# Patient Record
Sex: MALE | Race: WHITE | URBAN - NONMETROPOLITAN AREA
[De-identification: names, ages, dates, MRNs, and addresses within clinical notes are randomized per-mention and may not be internally consistent; named-entity substitution may affect disease eponyms.]

---

## 2018-01-17 PROBLEM — I10 HYPERTENSION: Status: ACTIVE | Noted: 2018-01-17

## 2018-01-17 PROBLEM — H90.0 CONDUCTIVE HEARING LOSS, BILATERAL: Status: ACTIVE | Noted: 2018-01-17

## 2018-01-17 PROBLEM — E78.5 HYPERLIPIDEMIA: Status: ACTIVE | Noted: 2018-01-17

## 2018-01-17 RX ORDER — DIPHENOXYLATE HYDROCHLORIDE AND ATROPINE SULFATE 2.5; .025 MG/1; MG/1
1 TABLET ORAL
COMMUNITY

## 2018-01-17 RX ORDER — HYDROCHLOROTHIAZIDE 25 MG/1
25 TABLET ORAL
COMMUNITY

## 2018-01-17 RX ORDER — SIMVASTATIN 40 MG
40 TABLET ORAL
COMMUNITY

## 2018-01-17 RX ORDER — CHLORAL HYDRATE 500 MG
1 CAPSULE ORAL
COMMUNITY

## 2018-01-17 RX ORDER — VALSARTAN 320 MG/1
320 TABLET ORAL
COMMUNITY

## 2018-01-17 RX ORDER — AMLODIPINE BESYLATE 10 MG/1
10 TABLET ORAL
COMMUNITY

## 2018-01-17 RX ORDER — MULTIVIT WITH MINERALS/LUTEIN
TABLET ORAL
COMMUNITY

## 2018-09-18 ENCOUNTER — HOSPITAL ENCOUNTER (EMERGENCY)
Facility: OTHER | Age: 78
Discharge: HOME | End: 2018-09-18
Attending: EMERGENCY MEDICINE | Admitting: EMERGENCY MEDICINE

## 2018-09-18 VITALS
RESPIRATION RATE: 18 BRPM | DIASTOLIC BLOOD PRESSURE: 89 MMHG | TEMPERATURE: 97.9 F | OXYGEN SATURATION: 96 % | SYSTOLIC BLOOD PRESSURE: 173 MMHG | HEIGHT: 65 IN | HEART RATE: 89 BPM

## 2018-09-18 DIAGNOSIS — H60.392 INFECTIVE OTITIS EXTERNA, LEFT: ICD-10-CM

## 2018-09-18 PROCEDURE — 99282 EMERGENCY DEPT VISIT SF MDM: CPT | Performed by: EMERGENCY MEDICINE

## 2018-09-18 PROCEDURE — 99283 EMERGENCY DEPT VISIT LOW MDM: CPT | Mod: Z6 | Performed by: EMERGENCY MEDICINE

## 2018-09-18 ASSESSMENT — ENCOUNTER SYMPTOMS
VOMITING: 0
AGITATION: 0
CHILLS: 0
SHORTNESS OF BREATH: 0
ARTHRALGIAS: 0
NAUSEA: 0
LIGHT-HEADEDNESS: 0
DYSURIA: 0
FEVER: 0

## 2018-09-18 ASSESSMENT — VISUAL ACUITY
OD: NOT TESTED
OS: NOT TESTED

## 2018-09-18 NOTE — ED PROVIDER NOTES
History     Chief Complaint   Patient presents with     Otalgia     Patient is a 78 year old male presenting with ear pain. The history is provided by the patient.   Ear Problem   Associated symptoms: no congestion, no fever, no rash and no vomiting      Carlos Guerin is a 78 year old male who is here complaining of left ear pain.  He recently got some new hearing aids which are much larger than his previous.  He has pain in the left ear which she believes is from this.  He normally sleeps lying on his left side and he was unable to sleep the last couple nights because of pain in the ear.  No drainage.  No other change in hearing.  Not feeling ill otherwise.    Problem List:    Patient Active Problem List    Diagnosis Date Noted     Conductive hearing loss, bilateral 01/17/2018     Priority: Medium     Hyperlipidemia 01/17/2018     Priority: Medium     Hypertension 01/17/2018     Priority: Medium     Seborrheic keratoses 04/16/2015     Priority: Medium     Mild diastolic dysfunction 01/11/2012     Priority: Medium     SUSHIL (obstructive sleep apnea) 01/03/2012     Priority: Medium     History of disease 06/16/2011     Priority: Medium     History of colonic polyps 04/13/2011     Priority: Medium     Diverticulosis of large intestine 04/11/2011     Priority: Medium     IGT (impaired glucose tolerance) 07/09/2010     Priority: Medium     High triglycerides 07/09/2010     Priority: Medium     Hypertension goal BP (blood pressure) < 130/80      Priority: Medium     Hyperlipidemia LDL goal <100 05/09/2010     Priority: Medium     Provider review. Agree with goal.         Fatty liver 02/04/2009     Priority: Medium      Diffuse fatty infiltration of the liver with scattered areas of  focal fatty sparing.  Noted on US         Renal cyst 02/04/2009     Priority: Medium     Renal cysts on US       Abdominal aortic aneurysm (H) 01/15/2009     Priority: Medium     July 2009 CT angiogram report:    1. Infrarenal abdominal  aortic aneurysm measuring up to 4.4 cm.    2. 6.1 cm inferior pole left renal cyst and 2.2 cm left hepatic cyst    or hemangioma.    3. Prostatic enlargement.    4. Hepatic fatty infiltration.    Due for f/u abdominal US in Jan 2010.       Obesity 05/13/2005     Priority: Medium     Problem list name updated by automated process. Provider to review       Hypertrophy of prostate without urinary obstruction 05/13/2005     Priority: Medium     Problem list name updated by automated process. Provider to review       Sleep apnea 04/22/2005     Priority: Medium     Problem list name updated by automated process. Provider to review          Past Medical History:    Past Medical History:   Diagnosis Date     Abdominal aneurysm (H)      Sleep apnea        Past Surgical History:    Past Surgical History:   Procedure Laterality Date     BIOPSY PROSTATE TRANSRECTAL      Prostate Bx for evaluation of elevated PSA; doesn't recall when     COLONOSCOPY      Colonoscopy showing polyps, repeat recommeded in 3 years     COLONOSCOPY      04/11/2011,Colonoscopy F/U 2016     OTHER SURGICAL HISTORY       6-8-2011,,PROSTATECTOMY,Open prostatectomy for BPH       Family History:    Family History   Problem Relation Age of Onset     Other - See Comments Father      Stroke,CVA     Substance Abuse Father      Alcohol/Drug,alcoholic     Other - See Comments Mother      GI Disease,possible GI bleed     Other - See Comments Brother      Stroke,Possible CVA     Diabetes Brother      Diabetes       Social History:  Marital Status:   [5]  Social History   Substance Use Topics     Smoking status: Not on file     Smokeless tobacco: Not on file     Alcohol use Not on file        Medications:      ASPIRIN LOW STRENGTH 81 MG OR CHEW   ciprofloxacin-hydrocortisone (CIPRO HC OTIC) otic suspension   DIOVAN 320 MG OR TABS   MULTIVITAMIN TABS   OR   amLODIPine (NORVASC) 10 MG tablet   amLODIPine (NORVASC) 10 MG tablet   ascorbic acid (VITAMIN C)  "1000 MG TABS   aspirin 81 MG tablet   Calcium Carbonate-Vitamin D3 600-400 MG-UNIT TABS   COQ-10 OR   fish oil-omega-3 fatty acids 1000 MG capsule   FLAX SEED OIL 1000 MG OR CAPS   hydrochlorothiazide (HYDRODIURIL) 25 MG tablet   hydrochlorothiazide (HYDRODIURIL) 25 MG tablet   Multiple Vitamin (MULTI-VITAMINS) TABS   OMEGA 3 120-180 MG OR CAPS   simvastatin (ZOCOR) 20 MG tablet   simvastatin (ZOCOR) 40 MG tablet   tamsulosin (FLOMAX) 0.4 MG 24 hr capsule   valsartan (DIOVAN) 320 MG tablet   VITAMIN C OR   VITAMIN D 400 UNIT OR CAPS         Review of Systems   Constitutional: Negative for chills and fever.   HENT: Positive for ear pain. Negative for congestion.    Eyes: Negative for visual disturbance.   Respiratory: Negative for shortness of breath.    Cardiovascular: Negative for chest pain.   Gastrointestinal: Negative for nausea and vomiting.   Genitourinary: Negative for dysuria.   Musculoskeletal: Negative for arthralgias.   Skin: Negative for rash.   Neurological: Negative for light-headedness.   Psychiatric/Behavioral: Negative for agitation.       Physical Exam   BP: 173/89  Pulse: 89  Temp: 97.9  F (36.6  C)  Resp: 18  Height: 165.1 cm (5' 5\")  SpO2: 96 %  Visual Acuity-Left: not tested  Visual Acuity-Right: not tested      Physical Exam   Constitutional: He is oriented to person, place, and time. He appears well-developed and well-nourished. No distress.   HENT:   Head: Normocephalic and atraumatic.   Left external canal is slightly swollen but minimally erythematous.  No active drainage but there is some potentially purulent looking wetness in the ear.   Eyes: Conjunctivae are normal.   Neck: Neck supple.   Cardiovascular: Normal rate.    Pulmonary/Chest: Effort normal.   Neurological: He is alert and oriented to person, place, and time.   Skin: Skin is warm and dry. He is not diaphoretic.   Psychiatric: He has a normal mood and affect. His behavior is normal.   Vitals reviewed.      ED Course     ED " Course     Procedures                 No results found for this or any previous visit (from the past 24 hour(s)).    Medications - No data to display    Assessments & Plan (with Medical Decision Making)     I have reviewed the nursing notes.    I have reviewed the findings, diagnosis, plan and need for follow up with the patient.  Looks like early otitis externa, will try some drops as below.  Follow-up in clinic if worse or not improving.    New Prescriptions    CIPROFLOXACIN-HYDROCORTISONE (CIPRO HC OTIC) OTIC SUSPENSION    Place 3 drops in ear(s) 2 times daily for 7 days       Final diagnoses:   Infective otitis externa, left       9/18/2018   [facility]     Bunny Jack MD  09/18/18 [id number]

## 2018-09-18 NOTE — DISCHARGE INSTRUCTIONS
External Ear Infection (Adult)    External otitis (also called  swimmer s ear ) is an infection in the ear canal. It is often caused by bacteria or fungus. It can occur a few days after water gets trapped in the ear canal (from swimming or bathing). It can also occur after cleaning too deeply in the ear canal with a cotton swab or other object. Sometimes, hair care products get into the ear canal and cause this problem.  Symptoms can include pain, fever, itching, redness, drainage, or swelling of the ear canal. Temporary hearing loss may also occur.  Home care    Do not try to clean the ear canal. This can push pus and bacteria deeper into the canal.    Use prescribed ear drops as directed. These help reduce swelling and fight the infection. If an ear wick was placed in the ear canal, apply drops right onto the end of the wick. The wick will draw the medicine into the ear canal even if it is swollen closed.    A cotton ball may be loosely placed in the outer ear to absorb any drainage.    You may use acetaminophen or ibuprofen to control pain, unless another medicine was prescribed. Note: If you have chronic liver or kidney disease or ever had a stomach ulcer or GI bleeding, talk to your healthcare provider before taking any of these medicines.    Do not allow water to get into your ear when bathing. Also, don't swim until the infection has cleared.  Prevention    Keep your ears dry. This helps lower the risk of infection. Dry your ears with a towel or hair dryer after getting wet. Also, use ear plugs when swimming.    Do not stick any objects in the ear to remove wax.    If you feel water trapped in your ear, use ear drops right away. You can get these drops over the counter at most drugstores. They work by removing water from the ear canal.  Follow-up care  Follow up with your healthcare provider in 1 week, or as advised.  When to seek medical advice  Call your healthcare provider right away if any of these  occur:    Ear pain becomes worse or doesn t improve after 3 days of treatment    Redness or swelling of the outer ear occurs or gets worse    Headache    Painful or stiff neck    Drowsiness or confusion    Fever of 100.4 F (38 C) or higher, or as directed by your healthcare provider    Seizure  Date Last Reviewed: 10/1/2017    0176-6788 [employer]. [address] [place] [address]. All rights reserved. This information is not intended as a substitute for professional medical care. Always follow your healthcare professional's instructions.

## 2018-09-18 NOTE — ED AVS SNAPSHOT
[facility]    [address]    [place] [address]    Phone:  [phone]    Fax:  [phone]                                       Carlos Guerin   MRN: [id number]    Department:  [facility]   Date of Visit:  9/18/2018           After Visit Summary Signature Page     I have received my discharge instructions, and my questions have been answered. I have discussed any challenges I see with this plan with the nurse or doctor.    ..........................................................................................................................................  Patient/Patient Representative Signature      ..........................................................................................................................................  Patient Representative Print Name and Relationship to Patient    ..................................................               ................................................  Date                                   Time    ..........................................................................................................................................  Reviewed by Signature/Title    ...................................................              ..............................................  Date                                               Time          22EPIC Rev 08/18

## 2018-09-18 NOTE — ED AVS SNAPSHOT
[facility]    [address]    [place] [address]    Phone:  [phone]    Fax:  [phone]                                       Carlos Guerin   MRN: [id number]    Department:  [facility]   Date of Visit:  9/18/2018           Patient Information     Date Of Birth          1940        Your diagnoses for this visit were:     Infective otitis externa, left        You were seen by Bunny Jack MD.        Discharge Instructions         External Ear Infection (Adult)    External otitis (also called  swimmer s ear ) is an infection in the ear canal. It is often caused by bacteria or fungus. It can occur a few days after water gets trapped in the ear canal (from swimming or bathing). It can also occur after cleaning too deeply in the ear canal with a cotton swab or other object. Sometimes, hair care products get into the ear canal and cause this problem.  Symptoms can include pain, fever, itching, redness, drainage, or swelling of the ear canal. Temporary hearing loss may also occur.  Home care    Do not try to clean the ear canal. This can push pus and bacteria deeper into the canal.    Use prescribed ear drops as directed. These help reduce swelling and fight the infection. If an ear wick was placed in the ear canal, apply drops right onto the end of the wick. The wick will draw the medicine into the ear canal even if it is swollen closed.    A cotton ball may be loosely placed in the outer ear to absorb any drainage.    You may use acetaminophen or ibuprofen to control pain, unless another medicine was prescribed. Note: If you have chronic liver or kidney disease or ever had a stomach ulcer or GI bleeding, talk to your healthcare provider before taking any of these medicines.    Do not allow water to get into your ear when bathing. Also, don't swim until the infection has cleared.  Prevention    Keep your ears dry. This helps lower the risk of  infection. Dry your ears with a towel or hair dryer after getting wet. Also, use ear plugs when swimming.    Do not stick any objects in the ear to remove wax.    If you feel water trapped in your ear, use ear drops right away. You can get these drops over the counter at most drugstores. They work by removing water from the ear canal.  Follow-up care  Follow up with your healthcare provider in 1 week, or as advised.  When to seek medical advice  Call your healthcare provider right away if any of these occur:    Ear pain becomes worse or doesn t improve after 3 days of treatment    Redness or swelling of the outer ear occurs or gets worse    Headache    Painful or stiff neck    Drowsiness or confusion    Fever of 100.4 F (38 C) or higher, or as directed by your healthcare provider    Seizure  Date Last Reviewed: 10/1/2017    8978-1293 [employer]. [address] [place] [address]. All rights reserved. This information is not intended as a substitute for professional medical care. Always follow your healthcare professional's instructions.          24 Hour Appointment Hotline     To schedule an appointment at [facility], please call [phone]. If you don't have a family doctor or clinic, we will help you find one. [place] clinics are conveniently located to serve the needs of you and your family.           Review of your medicines      START taking        Dose / Directions Last dose taken    ciprofloxacin-hydrocortisone otic suspension   Commonly known as:  CIPRO HC OTIC   Dose:  3 drop   Quantity:  1 Bottle        Place 3 drops in ear(s) 2 times daily for 7 days   Refills:  0          Our records show that you are taking the medicines listed below. If these are incorrect, please call your family doctor or clinic.        Dose / Directions Last dose taken    * amLODIPine 10 MG tablet   Commonly known as:  NORVASC   Dose:  10 mg        Take 10 mg by mouth daily.   Refills:  0        *  amLODIPine 10 MG tablet   Commonly known as:  NORVASC   Dose:  10 mg        Take 10 mg by mouth   Refills:  0        ascorbic acid 1000 MG Tabs   Commonly known as:  vitamin C        Refills:  0        * aspirin 81 MG tablet   Dose:  81 mg        Take 81 mg by mouth   Refills:  0        * ASPIRIN LOW STRENGTH 81 MG chewable tablet   Quantity:  30   Generic drug:  aspirin        1 TABLET DAILY   Refills:  0        Calcium Carbonate-Vitamin D3 600-400 MG-UNIT Tabs        Refills:  0        COQ-10 PO        1 tablet daily   Refills:  0        * valsartan 320 MG tablet   Commonly known as:  DIOVAN   Dose:  320 mg        Take 320 mg by mouth   Refills:  0        * DIOVAN 320 MG tablet   Quantity:  90 Tab   Generic drug:  valsartan        ONE DAILY   Refills:  3        flax seed oil 1000 MG capsule        Refills:  0        * hydrochlorothiazide 25 MG tablet   Commonly known as:  HYDRODIURIL   Dose:  25 mg        Take 25 mg by mouth   Refills:  0        * hydrochlorothiazide 25 MG tablet   Commonly known as:  HYDRODIURIL   Dose:  12.5 mg   Quantity:  90 tablet        Take 0.5 tablets by mouth daily.   Refills:  0        * MULTI-VITAMINS Tabs   Dose:  1 tablet        Take 1 tablet by mouth   Refills:  0        * MULTIVITAMIN TABS   OR        1  Q Day   Refills:  0        * fish oil-omega-3 fatty acids 1000 MG capsule   Dose:  1 capsule        Take 1 capsule by mouth   Refills:  0        * OMEGA 3 120-180 MG Caps   Quantity:  60        2 daily   Refills:  0        * simvastatin 20 MG tablet   Commonly known as:  ZOCOR   Dose:  20 mg        Take 20 mg by mouth At Bedtime.   Refills:  0        * simvastatin 40 MG tablet   Commonly known as:  ZOCOR   Dose:  40 mg        Take 40 mg by mouth   Refills:  0        tamsulosin 0.4 MG capsule   Commonly known as:  FLOMAX   Dose:  0.4 mg   Quantity:  14 capsule        Take 1 capsule by mouth daily.   Refills:  0        VITAMIN C PO        1 daily   Refills:  0        vitamin D 400  "units capsule        1 CAPSULE DAILY   Refills:  0        * Notice:  This list has 14 medication(s) that are the same as other medications prescribed for you. Read the directions carefully, and ask your doctor or other care provider to review them with you.            Prescriptions were sent or printed at these locations (1 Prescription)                   [id number] - [place], [place] - [address]   [address] [address]    Telephone:  [phone]   Fax:  [phone]   Hours:                  E-Prescribed (1 of 1)         ciprofloxacin-hydrocortisone (CIPRO HC OTIC) otic suspension                Orders Needing Specimen Collection     None      Pending Results     No orders found from 9/16/2018 to 9/19/2018.            Pending Culture Results     No orders found from 9/16/2018 to 9/19/2018.            Pending Results Instructions     If you had any lab results that were not finalized at the time of your Discharge, you can call the ED Lab Result RN at [phone]. You will be contacted by this team for any positive Lab results or changes in treatment. The nurses are available 7 days a week from 10A to 6:30P.  You can leave a message 24 hours per day and they will return your call.        Thank you for choosing [facility]       Thank you for choosing [facility] for your care. Our goal is always to provide you with excellent care. Hearing back from our patients is one way we can continue to improve our services. Please take a few minutes to complete the written survey that you may receive in the mail after you visit with us. Thank you!        [facility] Information     [employer] lets you send messages to your doctor, view your test results, renew your prescriptions, schedule appointments and more. To sign up, go to [email or web address] . Click on \"Log in\" on the left side of the screen, which will take you to the Welcome page. Then click on \"Sign up Now\" on the right " side of the page.     You will be asked to enter the access code listed below, as well as some personal information. Please follow the directions to create your username and password.     Your access code is: [email or web address]  Expires: 2018  7:55 AM     Your access code will  in 90 days. If you need help or a new code, please call your [facility] clinic or [phone].        Care EveryWhere ID     This is your Care EveryWhere ID. This could be used by other organizations to access your [facility] medical records  [id number]        Equal Access to Services     [facility] campusWILLIAM : Hadlashonda Perez, sary nolasco, anjel villalobos, elias morgan . So [facility] [phone].    ATENCIÓN: Si habla español, tiene a ty disposición servicios gratuitos de asistencia lingüística. Llame al [phone].    We comply with applicable federal civil rights laws and [place] laws. We do not discriminate on the basis of race, color, national origin, age, disability, sex, sexual orientation, or gender identity.            After Visit Summary       This is your record. Keep this with you and show to your community pharmacist(s) and doctor(s) at your next visit.

## 2018-09-18 NOTE — ED TRIAGE NOTES
Patient present to the ER for left ear pain that has gotten worse over the last 3 days.  Patient is unable to recall home medications.  Patient states that he just recently received new hearing aides that were bigger than the last and thought maybe that is what has caused the pain.  The pain is keeping him up at night.  Patient states that he can push on the outer ear and it relieves the pain for a bit.